# Patient Record
Sex: FEMALE | Race: WHITE
[De-identification: names, ages, dates, MRNs, and addresses within clinical notes are randomized per-mention and may not be internally consistent; named-entity substitution may affect disease eponyms.]

---

## 2018-04-20 ENCOUNTER — HOSPITAL ENCOUNTER (INPATIENT)
Dept: HOSPITAL 92 - L&D | Age: 33
LOS: 5 days | Discharge: HOME | End: 2018-04-25
Attending: OBSTETRICS & GYNECOLOGY | Admitting: OBSTETRICS & GYNECOLOGY
Payer: COMMERCIAL

## 2018-04-20 VITALS — BODY MASS INDEX: 28.1 KG/M2

## 2018-04-20 DIAGNOSIS — Z3A.40: ICD-10-CM

## 2018-04-20 PROCEDURE — 90715 TDAP VACCINE 7 YRS/> IM: CPT

## 2018-04-20 PROCEDURE — A4216 STERILE WATER/SALINE, 10 ML: HCPCS

## 2018-04-20 PROCEDURE — 90384 RH IG FULL-DOSE IM: CPT

## 2018-04-20 PROCEDURE — S0020 INJECTION, BUPIVICAINE HYDRO: HCPCS

## 2018-04-20 PROCEDURE — 87340 HEPATITIS B SURFACE AG IA: CPT

## 2018-04-20 PROCEDURE — 96372 THER/PROPH/DIAG INJ SC/IM: CPT

## 2018-04-20 PROCEDURE — 86780 TREPONEMA PALLIDUM: CPT

## 2018-04-20 PROCEDURE — 36415 COLL VENOUS BLD VENIPUNCTURE: CPT

## 2018-04-20 PROCEDURE — 85027 COMPLETE CBC AUTOMATED: CPT

## 2018-04-20 PROCEDURE — 85461 HEMOGLOBIN FETAL: CPT

## 2018-04-20 PROCEDURE — 51702 INSERT TEMP BLADDER CATH: CPT

## 2018-04-23 LAB
HBSAG INDEX: 0.19 S/CO (ref 0–0.99)
HGB BLD-MCNC: 11.1 G/DL (ref 12–16)
MCH RBC QN AUTO: 29.5 PG (ref 27–31)
MCV RBC AUTO: 83.1 FL (ref 81–99)
PLATELET # BLD AUTO: 211 THOU/UL (ref 130–400)
RBC # BLD AUTO: 3.78 MILL/UL (ref 4.2–5.4)
SYPHILIS ANTIBODY INDEX: 0.04 S/CO
WBC # BLD AUTO: 8.3 THOU/UL (ref 4.8–10.8)

## 2018-04-23 PROCEDURE — 10907ZC DRAINAGE OF AMNIOTIC FLUID, THERAPEUTIC FROM PRODUCTS OF CONCEPTION, VIA NATURAL OR ARTIFICIAL OPENING: ICD-10-PCS | Performed by: OBSTETRICS & GYNECOLOGY

## 2018-04-23 PROCEDURE — 3E033VJ INTRODUCTION OF OTHER HORMONE INTO PERIPHERAL VEIN, PERCUTANEOUS APPROACH: ICD-10-PCS | Performed by: OBSTETRICS & GYNECOLOGY

## 2018-04-23 RX ADMIN — DOCUSATE CALCIUM SCH MG: 240 CAPSULE, LIQUID FILLED ORAL at 21:32

## 2018-04-23 NOTE — PDOC.LDHP
Labor and Delivery H&P


Chief complaint: scheduled induction


HPI: 





33yo  at 40w3d by LMP for postdate IOL.  No complaints.  


Current gestational age (weeks): 40


Due date: 18


Dating criteria: last menstrual period


Grav: 2


Para: 1


Current pregnancy complications: none


Abnormal US findings: No (EFW 88%)


Current medications: pre-balaji vitamins


Previous surgical history: other (breast fibroadenoma)


Allergies/Adverse Reactions: 


 Allergies











Allergy/AdvReac Type Severity Reaction Status Date / Time


 


No Known Allergies Allergy   Unverified 18 07:38











Social history: none





- Physical Exam


Vital signs reviewed and normal: yes


General: NAD


Heart: RRR


Lungs: CTAB


Abdomen: gravid


Extremeties: no edema


FHT: category 1


[place] contractions every: q10min





- Vaginal Exam


cm dilated: 4


Effacement: 75%


Station: -2 (arom clear)





- OB Labs


RH: negative


Antibody Screen: negative


HIV: negative


RPR: negative


HEPSAg: negative


1 hour GCT: positive


3 hour GTT: negative


GBS: negative


Rubella: immune





- Assessment


L&D Assessment: medically indicated induction





- Plan


Plan: admit to L&D, labor augmentation if indicated, informed consent obtained, 

anesthesia consult for pain management

## 2018-04-23 NOTE — PDOC.OPDEL
OB Operative/Delivery Note


Delivery Dr/Surgeon: Veronika


Assist: n/a


Pre-Delivery Diagnosis: medically indicated induction


Procedure/Post Delivery Dx: spontaneous vaginal delivery


Weeks gestation: 40


Anesthesia: epidural





- Findings


  ** A


Sex: female


Apgar - 1 min: 9


Apgar - 5 min: 10





- Additional Findings/Plan


Placenta delivered: spontaneous


Repaired Obstetrical Laceration: none


Estimated blood loss: 50


Post delivery plan: routine recovery

## 2018-04-24 RX ADMIN — DOCUSATE CALCIUM SCH MG: 240 CAPSULE, LIQUID FILLED ORAL at 09:42

## 2018-04-24 RX ADMIN — DOCUSATE CALCIUM SCH MG: 240 CAPSULE, LIQUID FILLED ORAL at 21:14

## 2018-04-24 NOTE — PDOC.PP
Post Partum Progress Note


Post Partum Day #: 1


PO intake tolerated: yes


Flatus: yes


Ambulation: yes


 Vital Signs (12 hours)











  Temp Pulse Resp BP


 


 04/24/18 08:00  98.3 F  56 L  16  106/63








 Weight











Weight                         175 lb

















- Physical Examination


General: NAD


Cardiovascular: RRR


Respiratory: non-labored breathing


Abdominal: no distention, appropriately TTP


Neurological: no gross focal deficits


Psychiatric: normal affect


Result Diagrams: 


 04/23/18 07:35





Additional Labs: 


 Post Partum Labs











Hep Bs Antigen  Non-Reactive S/CO (NonReactive)   04/23/18  07:35    











(1) Term pregnancy delivered


Code(s): O80 - ENCOUNTER FOR FULL-TERM UNCOMPLICATED DELIVERY   Status: Acute   





- Assessment/Plan





PPD1 s/p TSVD


VSSAF


Doing well bleeding appropriate


Rh neg s/p rhogam, RImm


Baby under bili lights, breastfeeding


Home tomorrow

## 2018-04-25 VITALS — SYSTOLIC BLOOD PRESSURE: 104 MMHG | TEMPERATURE: 97.7 F | DIASTOLIC BLOOD PRESSURE: 67 MMHG

## 2018-04-25 RX ADMIN — DOCUSATE CALCIUM SCH MG: 240 CAPSULE, LIQUID FILLED ORAL at 08:41

## 2018-04-25 NOTE — PDOC.PP
Post Partum Progress Note


Post Partum Day #: 2


PO intake tolerated: yes


Flatus: yes


Ambulation: yes


 Vital Signs (12 hours)











  Temp Pulse Resp BP


 


 04/25/18 07:35  97.7 F  74  18  104/67








 Weight











Weight                         175 lb

















- Physical Examination


General: NAD


Cardiovascular: RRR


Respiratory: non-labored breathing


Fundus firm & at: umb-2


Neurological: no gross focal deficits


Psychiatric: normal affect


Result Diagrams: 


 04/23/18 07:35





Additional Labs: 


 Post Partum Labs











Hep Bs Antigen  Non-Reactive S/CO (NonReactive)   04/23/18  07:35    











(1) Term pregnancy delivered


Code(s): O80 - ENCOUNTER FOR FULL-TERM UNCOMPLICATED DELIVERY   Status: Acute   





- Assessment/Plan





PPD2 s/p TSVD


VSSAF


Doing well met all milestones


Rh neg sp Rhogam RImm


DC home FU 6 wk